# Patient Record
Sex: FEMALE
[De-identification: names, ages, dates, MRNs, and addresses within clinical notes are randomized per-mention and may not be internally consistent; named-entity substitution may affect disease eponyms.]

---

## 2022-07-20 LAB
BUN BLD-MCNC: 19 MG/DL (ref 7–18)
GLUCOSE SERPLBLD-MCNC: 89 MG/DL (ref 74–106)
HCT VFR BLD CALC: 32.8 % (ref 36–45)
LYMPHOCYTES # SPEC AUTO: 1.4 K/UL (ref 0.7–4.9)
MCV RBC: 81.4 FL (ref 80–100)
PMV BLD: 7.4 FL (ref 7.6–11.3)
POTASSIUM SERPL-SCNC: 3.9 MMOL/L (ref 3.5–5.1)
RBC # BLD: 4.03 M/UL (ref 3.86–4.86)

## 2022-07-20 NOTE — EKG
Test Date:    2022-07-20               Test Time:    11:04:55

Technician:   JEAN CARLOS                                     

                                                     

MEASUREMENT RESULTS:                                       

Intervals:                                           

Rate:         81                                     

AZ:           146                                    

QRSD:         82                                     

QT:           396                                    

QTc:          460                                    

Axis:                                                

P:            14                                     

AZ:           146                                    

QRS:          12                                     

T:            6                                      

                                                     

INTERPRETIVE STATEMENTS:                                       

                                                     

Normal sinus rhythm

Normal ECG

Compared to ECG 01/19/2015 12:46:45

Prolonged QT interval no longer present



Electronically Signed On 07-20-22 12:38:51 CDT by Ken Amador

## 2022-07-22 ENCOUNTER — HOSPITAL ENCOUNTER (OUTPATIENT)
Dept: HOSPITAL 97 - OR | Age: 62
Discharge: HOME | End: 2022-07-22
Attending: ORTHOPAEDIC SURGERY
Payer: COMMERCIAL

## 2022-07-22 VITALS — SYSTOLIC BLOOD PRESSURE: 133 MMHG | TEMPERATURE: 97 F | DIASTOLIC BLOOD PRESSURE: 60 MMHG

## 2022-07-22 VITALS — OXYGEN SATURATION: 99 %

## 2022-07-22 DIAGNOSIS — Z20.822: ICD-10-CM

## 2022-07-22 DIAGNOSIS — S52.571A: Primary | ICD-10-CM

## 2022-07-22 PROCEDURE — 85025 COMPLETE CBC W/AUTO DIFF WBC: CPT

## 2022-07-22 PROCEDURE — 36415 COLL VENOUS BLD VENIPUNCTURE: CPT

## 2022-07-22 PROCEDURE — 73100 X-RAY EXAM OF WRIST: CPT

## 2022-07-22 PROCEDURE — 93005 ELECTROCARDIOGRAM TRACING: CPT

## 2022-07-22 PROCEDURE — 82947 ASSAY GLUCOSE BLOOD QUANT: CPT

## 2022-07-22 PROCEDURE — 87811 SARS-COV-2 COVID19 W/OPTIC: CPT

## 2022-07-22 PROCEDURE — 80048 BASIC METABOLIC PNL TOTAL CA: CPT

## 2022-07-22 PROCEDURE — 25608 OPTX DST RD XART FX/EPI SEP2: CPT

## 2022-07-22 PROCEDURE — 0PSH04Z REPOSITION RIGHT RADIUS WITH INTERNAL FIXATION DEVICE, OPEN APPROACH: ICD-10-PCS

## 2022-07-22 NOTE — OP
Date of Procedure:  07/22/2022



Surgeon:  Michael Stone MD



Preoperative Diagnosis:  Right distal radius fracture with intra-articular extension.



Postoperative Diagnosis:  Right distal radius fracture with intra-articular extension.



Procedure:  Open reduction internal fixation of distal radius fracture with fixation of 2 intra-artic
ular fragments using the Acumed volar plating system.



Estimated Blood Loss:  Less than 5 cc.



Complications:  There were no complications.



Specimens:  No pathology specimen sent.



Indications:  Ms. Worthy arrived to see me in my office in a splint.  She is neurovascularly intact
.  X-rays were reviewed which revealed a displaced distal radius fracture with slight intra-articular
 extension with loss of volar angulation and some posterior tilt as well as loss of radial inclinatio
n and some shortening.  Risks, benefits, alternatives of different methods of treating this including
 closed treatment have been discussed with her and her family.  They stated they understand things as
 presented and wish to proceed.



Description Of Procedure:  The patient was taken to the operating room and placed in the supine posit
ion.  General anesthesia was obtained by staff.  Following this, well-padded tourniquet was placed on
 superior right arm.  Right upper extremity was then prepped and draped in usual sterile fashion for 
procedure.  Following this, the arm was then elevated, but not exsanguinated.  Tourniquet was raised.
  A standard incision was taken down carefully through the volar approach of Santosh with a zigzag at t
he distal radius crease.  This revealed the flexor carpi radialis tendon which was retracted radialwa
rd to protect the radial artery.  The underlying sheath was then exploited and the flexor pollicis lo
ngus muscle belly and tendon were then swept ulnarward to protect the median nerve.  This leads down 
to the pronator, which was divided at its midsubstance and gently moved off the bone using a wood parker
dle elevator.  This revealed the fracture site. It appears to be mostly plastically deformed and does
 respond to a forceful closed reduction maneuver which reestablishes the volar tilt as well as radial
 inclination.  After this, the Acumed plate was then applied in standard fashion and appears to be we
ll positioned.  The wound was irrigated and the skin was closed using interrupted nylon sutures, foll
owed by placing Aquacel dressing as well as soft roll and a volar splint.  She was taken to recovery 
room in good condition.  There were no complications.





/DAVE

DD:  07/22/2022 12:06:56Voice ID:  [id number]

DT:  07/22/2022 22:42:27Report ID:  [id number]

## 2022-07-22 NOTE — RAD REPORT
EXAM DESCRIPTION:  RAD - Wrist Right 2 View - 7/22/2022 2:09 pm

 

CLINICAL HISTORY:  ORIF DISTAL RADIUS

Pain

 

COMPARISON:  No comparisons

 

FINDINGS:  Fluoroscopy time 1.2 minutes.

## 2025-03-15 ENCOUNTER — HOSPITAL ENCOUNTER (INPATIENT)
Dept: HOSPITAL 97 - ER | Age: 65
LOS: 3 days | Discharge: HOME | DRG: 638 | End: 2025-03-18
Attending: INTERNAL MEDICINE | Admitting: INTERNAL MEDICINE
Payer: COMMERCIAL

## 2025-03-15 VITALS — OXYGEN SATURATION: 100 %

## 2025-03-15 VITALS — BODY MASS INDEX: 22.4 KG/M2

## 2025-03-15 DIAGNOSIS — E87.0: ICD-10-CM

## 2025-03-15 DIAGNOSIS — E11.40: ICD-10-CM

## 2025-03-15 DIAGNOSIS — Z90.710: ICD-10-CM

## 2025-03-15 DIAGNOSIS — E78.00: ICD-10-CM

## 2025-03-15 DIAGNOSIS — I10: ICD-10-CM

## 2025-03-15 DIAGNOSIS — E11.10: Primary | ICD-10-CM

## 2025-03-15 DIAGNOSIS — Z79.4: ICD-10-CM

## 2025-03-15 DIAGNOSIS — C77.9: ICD-10-CM

## 2025-03-15 DIAGNOSIS — C78.89: ICD-10-CM

## 2025-03-15 DIAGNOSIS — C16.9: ICD-10-CM

## 2025-03-15 DIAGNOSIS — R64: ICD-10-CM

## 2025-03-15 DIAGNOSIS — Z79.899: ICD-10-CM

## 2025-03-15 LAB
ALBUMIN SERPL BCP-MCNC: 3.2 G/DL (ref 3.4–5)
ALBUMIN/GLOB SERPL: 0.9 {RATIO} (ref 1.1–1.8)
ALP SERPL-CCNC: 76 U/L (ref 45–117)
ALT SERPL W P-5'-P-CCNC: 22 U/L (ref 13–56)
ANION GAP SERPL CALC-SCNC: 13.5 MEQ/L (ref 5–15)
ANION GAP SERPL CALC-SCNC: 22.2 MEQ/L (ref 5–15)
ANISOCYTOSIS BLD QL: (no result)
AST SERPL W P-5'-P-CCNC: 15 U/L (ref 15–37)
BILIRUB INDIRECT SERPL-MCNC: 0.3 MG/DL (ref 0.2–0.8)
BLD SMEAR INTERP: (no result)
BUN BLD-MCNC: 28 MG/DL (ref 7–18)
BUN BLD-MCNC: 33 MG/DL (ref 7–18)
GLOBULIN SER CALC-MCNC: 3.6 G/DL (ref 2.3–3.5)
GLUCOSE SERPLBLD-MCNC: 165 MG/DL (ref 74–106)
GLUCOSE SERPLBLD-MCNC: 284 MG/DL (ref 74–106)
HCT VFR BLD CALC: 36.9 % (ref 36–45)
HGB BLD-MCNC: 12 G/DL (ref 12–15)
LIPASE SERPL-CCNC: 29 U/L (ref 13–75)
LYMPHOCYTES # SPEC AUTO: 0.7 K/UL (ref 0.7–4.9)
MAGNESIUM SERPL-MCNC: 2.1 MG/DL (ref 1.6–2.4)
MAGNESIUM SERPL-MCNC: 2.2 MG/DL (ref 1.6–2.4)
MCH RBC QN AUTO: 28.3 PG (ref 27–35)
MCHC RBC AUTO-ENTMCNC: 32.6 G/DL (ref 32–36)
MCV RBC: 86.8 FL (ref 80–100)
MICROCYTES BLD QL SMEAR: (no result)
MORPHOLOGY BLD-IMP: (no result)
NRBC # BLD: 0 10*3/UL (ref 0–0)
NRBC BLD AUTO-RTO: 0.4 % (ref 0–0)
PMV BLD: 7.2 FL (ref 7.6–11.3)
POTASSIUM SERPL-SCNC: 4.2 MEQ/L (ref 3.5–5.1)
POTASSIUM SERPL-SCNC: 4.5 MEQ/L (ref 3.5–5.1)
RBC # BLD: 4.25 M/UL (ref 3.86–4.86)
SQUAMOUS URNS QL MICRO: <5 /HPF
TROPONIN I SERPL HS-MCNC: 7.5 PG/ML (ref ?–58.9)
UA COMPLETE W REFLEX CULTURE PNL UR: (no result)
UA DIPSTICK W REFLEX MICRO PNL UR: (no result)
WBC # BLD AUTO: 1.9 THOU/UL (ref 4.3–10.9)

## 2025-03-15 PROCEDURE — 84484 ASSAY OF TROPONIN QUANT: CPT

## 2025-03-15 PROCEDURE — 80048 BASIC METABOLIC PNL TOTAL CA: CPT

## 2025-03-15 PROCEDURE — 83735 ASSAY OF MAGNESIUM: CPT

## 2025-03-15 PROCEDURE — 84100 ASSAY OF PHOSPHORUS: CPT

## 2025-03-15 PROCEDURE — 81001 URINALYSIS AUTO W/SCOPE: CPT

## 2025-03-15 PROCEDURE — 97161 PT EVAL LOW COMPLEX 20 MIN: CPT

## 2025-03-15 PROCEDURE — 82947 ASSAY GLUCOSE BLOOD QUANT: CPT

## 2025-03-15 PROCEDURE — 99285 EMERGENCY DEPT VISIT HI MDM: CPT

## 2025-03-15 PROCEDURE — 83690 ASSAY OF LIPASE: CPT

## 2025-03-15 PROCEDURE — 85025 COMPLETE CBC W/AUTO DIFF WBC: CPT

## 2025-03-15 PROCEDURE — 74177 CT ABD & PELVIS W/CONTRAST: CPT

## 2025-03-15 PROCEDURE — 96374 THER/PROPH/DIAG INJ IV PUSH: CPT

## 2025-03-15 PROCEDURE — 71045 X-RAY EXAM CHEST 1 VIEW: CPT

## 2025-03-15 PROCEDURE — 80076 HEPATIC FUNCTION PANEL: CPT

## 2025-03-15 PROCEDURE — 36415 COLL VENOUS BLD VENIPUNCTURE: CPT

## 2025-03-15 PROCEDURE — 93005 ELECTROCARDIOGRAM TRACING: CPT

## 2025-03-15 RX ADMIN — DEXMEDETOMIDINE HYDROCHLORIDE SCH MLS/HR: 100 INJECTION, SOLUTION, CONCENTRATE INTRAVENOUS at 15:46

## 2025-03-15 RX ADMIN — DEXTROSE AND SODIUM CHLORIDE SCH MLS: 5; .45 INJECTION, SOLUTION INTRAVENOUS at 15:21

## 2025-03-15 RX ADMIN — DEXTROSE MONOHYDRATE SCH MLS: 50 INJECTION, SOLUTION INTRAVENOUS at 21:30

## 2025-03-15 RX ADMIN — DEXMEDETOMIDINE HYDROCHLORIDE SCH MLS/HR: 100 INJECTION, SOLUTION, CONCENTRATE INTRAVENOUS at 19:00

## 2025-03-15 RX ADMIN — ONDANSETRON PRN MG: 2 INJECTION INTRAMUSCULAR; INTRAVENOUS at 17:28

## 2025-03-15 RX ADMIN — PROMETHAZINE HYDROCHLORIDE PRN MG: 25 INJECTION INTRAMUSCULAR; INTRAVENOUS at 21:06

## 2025-03-15 RX ADMIN — SODIUM CHLORIDE SCH: 0.45 INJECTION, SOLUTION INTRAVENOUS at 15:21

## 2025-03-15 NOTE — RAD REPORT
EXAMINATION: ONE VIEW CHEST XR



CLINICAL INDICATION: weakness



TECHNIQUE: Frontal chest projection is submitted. Examination is limited by patient positioning and t
echnique.



COMPARISON: 1/19/2017



FINDINGS:



The lungs are well inflated and clear. The heart is normal in size. No displaced fractures identified
.  Right-sided port catheter is tip in SVC.



IMPRESSION: 



No acute intrathoracic abnormalities. 







Reported By: Andres Van 

Electronically Signed:  3/15/2025 11:48 AM

## 2025-03-15 NOTE — ER
Nurse's Notes                                                                                     

 [facility]                                                                 

Name: Fela Olivarez                                                                              

Age: 64 yrs                                                                                       

Sex: Female                                                                                       

: 1960                                                                                   

MRN: [id number]                                                                                   

Arrival Date: 03/15/2025                                                                          

Time: 10:47                                                                                       

Account#: [id number]                                                                            

Bed 20                                                                                            

Private MD:                                                                                       

Diagnosis: Diabetic ketoacidosis                                                                  

                                                                                                  

Presentation:                                                                                     

03/15                                                                                             

11:05 Chief complaint: Generalized weakness and cough x 1 week. On chemo for GI cancer, last  hb  

      treatment was Mon-Wed. Coronavirus screen: At this time, the client does not indicate       

      any symptoms associated with coronavirus-19. Ebola Screen: No symptoms or risks             

      identified at this time. Initial Sepsis Screen: Does the patient meet any 2 criteria?       

      No. Patient's initial sepsis screen is negative. Does the patient have a suspected          

      source of infection? No. Patient's initial sepsis screen is negative. Risk Assessment:      

      Do you want to hurt yourself or someone else? Patient reports no desire to harm self or     

      others. Onset of symptoms was 2025.                                               

11:05 Method Of Arrival: Wheelchair                                                           hb  

11:05 Acuity: LUCIA 2                                                                           hb  

                                                                                                  

Triage Assessment:                                                                                

11:05 General: Appears distressed, comfortable, Behavior is cooperative, appropriate for age, bp  

      anxious. Pain: Denies pain. EENT: No deficits noted. Neuro: No deficits noted.              

      Cardiovascular: Rhythm is sinus tachycardia. Respiratory: No deficits noted. GI:            

      Reports nausea. : No signs and/or symptoms were reported regarding the genitourinary      

      system. Derm: No deficits noted. Musculoskeletal: No deficits noted.                        

                                                                                                  

Historical:                                                                                       

- Allergies:                                                                                      

11:09 No Known Drug Allergies;                                                                hb  

- PMHx:                                                                                           

11:09 diabetes mellitus; Hypercholesterolemia; Hypertensive disorder;                         hb  

                                                                                                  

- Immunization history:: Adult Immunizations up to date.                                          

- Infectious Disease History:: Denies.                                                            

- Social history:: Smoking status: Patient denies any tobacco usage or history of.                

- Family history:: not pertinent.                                                                 

                                                                                                  

                                                                                                  

Screenin:18 [facility] ED Fall Risk Assessment (Adult) History of falling in the last 3 months,       bp  

      including since admission No falls in past 3 months (0 pts) Confusion or Disorientation     

      No (0 pts) Intoxicated or Sedated No (0 pts) Impaired Gait No (0 pts) Mobility Assist       

      Device Used No (0 pt) Altered Elimination No (0 pt) Score/Fall Risk Level 0 - 2 = Low       

      Risk Oriented to surroundings. Abuse screen: Denies threats or abuse. Denies injuries       

      from another. Nutritional screening: No deficits noted. Tuberculosis screening: No          

      symptoms or risk factors identified.                                                        

                                                                                                  

Assessment:                                                                                       

11:10 General: Appears in no apparent distress. uncomfortable. Pain: Complains of pain in     bp  

      abdomen. Neuro: No deficits noted. Cardiovascular: Rhythm is sinus tachycardia.             

      Respiratory: No deficits noted. GI: Reports nausea, vomiting. : No signs and/or           

      symptoms were reported regarding the genitourinary system. EENT: No deficits noted.         

      Derm: No deficits noted. Musculoskeletal: No deficits noted.                                

                                                                                                  

Vital Signs:                                                                                      

11:05  / 57; Pulse 132; Resp 15; Temp 99.4(O); Pulse Ox 100% on R/A; Weight 52.16 kg;   hb  

      Height 5 ft. 0 in. ; Pain 3/10;                                                             

13:04  / 64; Pulse 116; Resp 21; Pulse Ox 100% ;                                        bp  

14:41  / 49; Pulse 109; Resp 18; Pulse Ox 100% ;                                        bp  

11:05 Body Mass Index 22.46 (52.16 kg, 152.4 cm)                                              hb  

11:05 Pain Scale: Adult                                                                       hb  

                                                                                                  

ED Course:                                                                                        

10:51 Patient arrived in ED.                                                                  al6 

10:55 Joey Purcell, RN is Primary Nurse.                                                    bp  

10:56 Mazin Ballard MD is Attending Physician.                                            rt  

11:08 Triage completed.                                                                       hb  

11:10 Arm band placed on.                                                                     hb  

11:18 Patient has correct armband on for positive identification.                             bp  

11:18 Initial lab(s) drawn, by me, sent to lab. EKG done, by ED staff, reviewed by Mazin Ballard MD. Inserted saline lock: 22 gauge in right forearm, using aseptic               

      technique. Blood collected. Flushed with 10 mL NS.                                          

11:41 XRAY Chest (1 view) In Process Unspecified.                                             EDMS

13:24 Eliot Serrato MD is Hospitalizing Provider.                                            rt  

14:45 No provider procedures requiring assistance completed. Patient admitted, IV remains in  bp  

      place.                                                                                      

                                                                                                  

Administered Medications:                                                                         

11:24 Drug: NS 0.9% IV 1000 ml IV at 1000 ml once; to be given as a bolus over 60 minutes     bp  

      Route: IV; Rate: 1000 ml; Site: right forearm;                                              

11:24 Drug: Ondansetron IVP 4 mg IVP once; over 2 minutes Route: IVP; Site: right forearm;    bp  

13:39 Drug: NS 0.9% IV 1000 ml IV at 1 bolus Per protocol; to be given as a bolus over 60     bp  

      minutes Route: IV; Rate: 1 bolus; Site: left antecubital;                                   

                                                                                                  

                                                                                                  

Outcome:                                                                                          

13:24 Decision to Hospitalize by Provider.                                                    rt  

14:45 Admitted to ICU accompanied by nurse, via stretcher, room 4, with chart, Report called  bp  

      to  COLEEN LAMAR                                                                                

14:45 Condition: stable                                                                           

14:45 Instructed on the need for admit,                                                           

15:18 Patient left the ED.                                                                    bp  

                                                                                                  

Signatures:                                                                                       

Dispatcher [employer]                           EDMS                                                 

Marla Low RN                     RN   hb                                                   

Joey Purcell RN                      RN   bp                                                   

Mazin Ballard MD MD   rt                                                   

Cara Padilla6                                                  

                                                                                                  

**************************************************************************************************

## 2025-03-15 NOTE — EDPHYS
Physician Documentation                                                                           

 [facility]                                                                 

Name: Fela Olivarez                                                                              

Age: 64 yrs                                                                                       

Sex: Female                                                                                       

: 1960                                                                                   

MRN: [id number]                                                                                   

Arrival Date: 03/15/2025                                                                          

Time: 10:47                                                                                       

Account#: [id number]                                                                            

Bed 20                                                                                            

Private MD:                                                                                       

ED Physician Mazin Ballard                                                                     

HPI:                                                                                              

03/15                                                                                             

11:11 This 64 yrs old  Female presents to ER via Wheelchair with complaints of        rt  

      DEHYDRATION.                                                                                

11:11 Patient with history of gastric cancer and chemotherapy presents to the ED with nausea, rt  

      poor p.o. intake, reported generalized weakness. Denies cough, shortness of breath.         

      Denies other acute complaints at this time, symptoms are moderate in severity, no other     

      aggravating or alleviating factors..                                                        

                                                                                                  

Historical:                                                                                       

- Allergies:                                                                                      

: No Known Drug Allergies;                                                                hb  

- PMHx:                                                                                           

11:09 diabetes mellitus; Hypercholesterolemia; Hypertensive disorder;                         hb  

                                                                                                  

- Immunization history:: Adult Immunizations up to date.                                          

- Infectious Disease History:: Denies.                                                            

- Social history:: Smoking status: Patient denies any tobacco usage or history of.                

- Family history:: not pertinent.                                                                 

                                                                                                  

                                                                                                  

ROS:                                                                                              

11:11 Constitutional: Negative for fever, chills, and weight loss, Cardiovascular: Negative   rt  

      for chest pain, palpitations, and edema, Respiratory: Negative for shortness of breath,     

      cough, wheezing, and pleuritic chest pain, MS/Extremity: Negative for injury and            

      deformity, Skin: Negative for injury, rash, and discoloration,                              

11:11 Abdomen/GI: Positive for nausea, Negative for abdominal pain,                               

11:11 Neuro: Positive for dizziness, weakness,                                                    

                                                                                                  

Exam:                                                                                             

11:11 Constitutional:  This is a well developed, well nourished patient who is awake, alert,  rt  

      and in no acute distress. Head/Face:  Normocephalic, atraumatic. Chest/axilla:  Normal      

      chest wall appearance and motion.  Nontender with no deformity.  No lesions are             

      appreciated. Cardiovascular:  Regular rate and rhythm with a normal S1 and S2.  No          

      gallops, murmurs, or rubs.  Normal PMI, no JVD.  No pulse deficits. Respiratory:  Lungs     

      have equal breath sounds bilaterally, clear to auscultation and percussion.  No rales,      

      rhonchi or wheezes noted.  No increased work of breathing, no retractions or nasal          

      flaring. Abdomen/GI:  Soft, non-tender, with normal bowel sounds.  No distension or         

      tympany.  No guarding or rebound.  No evidence of tenderness throughout. Skin:  Warm,       

      dry with normal turgor.  Normal color with no rashes, no lesions, and no evidence of        

      cellulitis. MS/ Extremity:  Pulses equal, no cyanosis.  Neurovascular intact.  Full,        

      normal range of motion. Neuro:  Awake and alert, GCS 15, oriented to person, place,         

      time, and situation.  Cranial nerves II-XII grossly intact.  Motor strength 5/5 in all      

      extremities.  Sensory grossly intact.  Cerebellar exam normal.  Normal gait. Psych:         

      Awake, alert, with orientation to person, place and time.  Behavior, mood, and affect       

      are within normal limits.                                                                   

11:11 ENT: Dry mucous membranes, mucositis present.                                               

11:20 ECG was reviewed by the Attending Physician.                                            rt  

                                                                                                  

Vital Signs:                                                                                      

11:05  / 57; Pulse 132; Resp 15; Temp 99.4(O); Pulse Ox 100% on R/A; Weight 52.16 kg;   hb  

      Height 5 ft. 0 in. ; Pain 3/10;                                                             

13:04  / 64; Pulse 116; Resp 21; Pulse Ox 100% ;                                        bp  

14:41  / 49; Pulse 109; Resp 18; Pulse Ox 100% ;                                        bp  

11:05 Body Mass Index 22.46 (52.16 kg, 152.4 cm)                                              hb  

11:05 Pain Scale: Adult                                                                       hb  

                                                                                                  

MDM:                                                                                              

10:57 Medical Screening Exam initiated                                                        rt  

18:24 Differential diagnosis: Dehydration, DKA, electrolyte disturbance. Data reviewed: vital rt  

      signs, nurses notes, lab test result(s), EKG, radiologic studies. Consideration of          

      Admission/Observation Patient was admitted/placed on observation. Management of patient     

      was discussed with the following: Primary Care Provider: Agrees to admit. Test              

      considered but Not performed: CT: Dizziness due to metabolic changes, CT scan of the        

      head is not indicated. Care significantly affected by the following chronic conditions:     

      Diabetes. Counseling: I had a detailed discussion with the patient and/or guardian          

      regarding the historical points, exam findings, and any diagnostic results supporting       

      the discharge/admit diagnosis, lab results, radiology results, the need for further         

      work-up and treatment in the hospital. Response to treatment: the patient's symptoms        

      have mildly improved after treatment.                                                       

                                                                                                  

03/15                                                                                             

11:05 Order name: Basic Metabolic Panel; Complete Time: 11:53                                 rt  

03/15                                                                                             

11:05 Order name: CBC with Diff; Complete Time: 12:11                                         rt  

03/15                                                                                             

11:05 Order name: LFT's; Complete Time: 11:53                                                 rt  

03/15                                                                                             

11:05 Order name: Magnesium; Complete Time: 11:53                                             rt  

03/15                                                                                             

11:05 Order name: Troponin HS; Complete Time: 11:53                                           rt  

03/15                                                                                             

11:05 Order name: Lipase; Complete Time: 11:53                                                rt  

03/15                                                                                             

12:11 Order name: CBC Smear Scan; Complete Time: 12:11                                        EDMS

03/15                                                                                             

13:14 Order name: Glucose, Ancillary Testing                                                  EDMS

03/15                                                                                             

14:16 Order name: Glucose, Ancillary Testing                                                  EDMS

03/15                                                                                             

11:05 Order name: XRAY Chest (1 view); Complete Time: 11:52                                   rt  

03/15                                                                                             

11:05 Order name: Cardiac monitoring; Complete Time: 11:17                                    rt  

03/15                                                                                             

11:05 Order name: EKG - Nurse/Tech; Complete Time: 11:10                                      rt  

03/15                                                                                             

11:05 Order name: IV Saline Lock; Complete Time: 11:                                        rt  

03/15                                                                                             

11:05 Order name: Labs collected and sent; Complete Time: 11:                               rt  

03/15                                                                                             

11:05 Order name: O2 Per Protocol; Complete Time: 11:                                       rt  

03/15                                                                                             

11:05 Order name: O2 Sat Monitoring; Complete Time: 11:                                     rt  

                                                                                                  

EC:20 Rate is 118 beats/min. Rhythm is regular, Normal Sinus Rhythm with No ectopy. QRS Axis  rt  

      is Normal. NE interval is normal. QRS interval is normal. QT interval is normal. No Q       

      waves. T waves are Normal. No ST changes noted. Interpreted by me.                          

                                                                                                  

Administered Medications:                                                                         

11:24 Drug: NS 0.9% IV 1000 ml IV at 1000 ml once; to be given as a bolus over 60 minutes     bp  

      Route: IV; Rate: 1000 ml; Site: right forearm;                                              

11:24 Drug: Ondansetron IVP 4 mg IVP once; over 2 minutes Route: IVP; Site: right forearm;    bp  

13:39 Drug: NS 0.9% IV 1000 ml IV at 1 bolus Per protocol; to be given as a bolus over 60     bp  

      minutes Route: IV; Rate: 1 bolus; Site: left antecubital;                                   

                                                                                                  

                                                                                                  

Disposition:                                                                                      

18:24 Critical Care:.                                                                         rt  

                                                                                                  

Disposition Summary:                                                                              

03/15/25 13:24                                                                                    

Hospitalization Ordered                                                                           

 Notes:       Hospitalization Status: Inpatient Admission                                           
  rt

      Provider: Eliot Serrato                                                                 rt  

      Location: Intensive Care Unit                                                           rt  

      Condition: Fair                                                                         rt  

      Problem: new                                                                            rt  

      Symptoms: have improved                                                                 rt  

      Bed/Room Type: Standard                                                                 rt  

      Room Assignment: 4-(03/15/25 14:28)                                                     ty  

      Diagnosis                                                                                   

        - Diabetic ketoacidosis                                                               rt  

      Forms:                                                                                      

        - Medication Reconciliation Form                                                      rt  

        - SBAR form                                                                           rt  

        - Leadership Thank You Letter                                                         rt  

Critical care time excluding procedures:                                                          

18:24 Critical care time: Bedside Care: 30 minutes, Consultation: 5 minutes. Total time: 35   rt  

      minutes                                                                                     

                                                                                                  

Signatures:                                                                                       

Dispatcher [employer]                           EDMS                                                 

Marla Low RN                     RN   hb                                                   

Joey Purcell RN                      RN   bp                                                   

Mazin Ballard MD MD   rt                                                   

Michele Mcdaniels                               ty                                                   

                                                                                                  

Corrections: (The following items were deleted from the chart)                                    

11:05 11:05 BASIC METABOLIC PANEL+C.LAB.BRZ ordered. EDMS                                     EDMS

11:05 11:05 CBC+H.LAB.BRZ ordered. EDMS                                                       EDMS

11:05 11:05 HEPATIC FUNCTION+C.LAB.BRZ ordered. EDMS                                          EDMS

11:05 11:05 MAGNESIUM+C.LAB.BRZ ordered. EDMS                                                 EDMS

11:05 11:05 Troponin High Sensitivity+C.LAB.BRZ ordered. EDMS                                 EDMS

11:05 11:05 LIPASE+C.LAB.BRZ ordered. EDMS                                                    EDMS

11:05 11:05 Chest Single View+RAD.RAD.BRZ ordered. EDMS                                       EDMS

14:28 13:24 rt                                                                                ty  

                                                                                                  

**************************************************************************************************

## 2025-03-15 NOTE — P.HP
Patient History


Date of Service: 03/15/25


Reason for admission: NAUSEA, WEAK


History of Present Illness: 





KATIE HAS BEEN VERY NAUSEOUS SINCE CHEMO.  HER AG ON ADMISSION IS 26.  SHE IS A 

DIABETIC.  SHE IS IN DKA AND I ASKED ER MD TO START HER ON INSULIN DRIP.  SHE 

HAS ELECT MANAGED EVERY FEW HOURS. AG IS DOWN TO 13 NOW. 


Allergies





No Known Drug Allergies Allergy (Verified 07/20/22 10:49)


   Unknown





Home medications list reviewed: Yes


Home Medications: 








Dapagliflozin Propanediol [Farxiga] 10 mg PO DAILY 11/19/24 


Amlodipine [Norvasc] 10 mg PO DAILY 03/15/25 


Insulin Detemir [Levemir] 15 units SQ BID 03/15/25 


Insulin Lispro 8 unit SQ TID 03/15/25 


Magic Mouthwash [Magic Mouthwash*] 10 ml PO BID 03/15/25 


Multivitamin [Multiple Vitamins] 1 each PO DAILY 03/15/25 


Omeprazole [Prilosec] 40 mg PO DAILY 03/15/25 


Ondansetron [Zofran] 8 mg PO Q8H PRN 03/15/25 


Thiamine HCl [Vitamin B-1] 100 mg PO DAILY 03/15/25 








- Past Medical/Surgical History


Has patient received pneumonia vaccine in the past: Yes


Diabetic: Yes


-: HTN


-: neuorpathy


-: Hyperlipidemia


-: GI stomach cancer Nov 24 mets to esophagus, lymph nodes.


-: Non-insulin-dependent diabetes


-: Gi bleed.


-: hysterectomy


-: current chemo Monday, wears chemo pump for 46 hours


-: Wrist surgery


Psychosocial/ Personal History: Lives at home with family





- Family History


  ** Mother


-: Kidney disease





- Social History


Smoking Status: Never smoker


Alcohol use: No


CD- Drugs: No


Caffeine use: Yes


Place of Residence: Home





Review of Systems


10-point ROS is otherwise unremarkable


General: Weakness





Physical Examination





- Vital Signs


Temperature: 97.8 F


Blood Pressure: 119/50


Pulse: 114


Respirations: 18


Pulse Ox (%): 100





- Physical Exam


General: Oriented x3, Mild distress, Moderate distress


HEENT: Atraumatic, PERRLA, Mucous membr. moist/pink, EOMI, Sclerae nonicteric


Neck: Supple, 2+ carotid pulse no bruit, No LAD, Without JVD or thyroid 

abnormality


Respiratory: Clear to auscultation bilaterally, Normal air movement


Cardiovascular: Regular rate/rhythm, Normal S1 S2


Gastrointestinal: Normal bowel sounds, No tenderness


Musculoskeletal: No tenderness


Integumentary: No rashes


Neurological: Normal gait, Normal speech, Normal strength at 5/5 x4 extr, Normal

tone, Normal affect


Lymphatics: No axilla or inguinal lymphadenopathy





- Studies


Laboratory Data (last 24 hrs)











  03/15/25 03/15/25





  11:17 11:17


 


WBC  1.90 L 


 


Hgb  12.0 


 


Hct  36.9 


 


Plt Count  157 


 


Sodium   146 H


 


Potassium   4.2


 


BUN   33 H


 


Creatinine   0.70


 


Glucose   284 H


 


Magnesium   2.2


 


Total Bilirubin   0.5


 


AST   15


 


ALT   22


 


Alkaline Phosphatase   76


 


Lipase   29











Assessment and Plan





- Problems (Diagnosis)


(1) Nausea


Current Visit: Yes   Status: Acute   


Plan: 


ZOFRAN IS NOT WORKING. 


START PHENERGAN


THIS IS FROM DKA INDUCED GI DIST.








(2) Esophageal cancer


Current Visit: Yes   Status: Chronic   


Plan: 


ADVANCED


Qualifiers: 


   Malignant neoplasm of esophagus location: lower third   Qualified Code(s): 

C15.5 - Malignant neoplasm of lower third of esophagus   





(3) Diabetic ketoacidosis


Onset Date: 01/20/15   Current Visit: No   Status: Acute   


Plan: 


DKA PROTOCOL 


INSULIN DRIP 


ELECTROLYTE CHECK


FU


KPHOS IN AM. 


LAB IN AM.


STABLE WITH GUARDED PROGNOSIS. 








- Advance Directives


Does patient have a Living Will: Yes


Does patient have a Durable POA for Healthcare: Yes

## 2025-03-16 LAB
ANION GAP SERPL CALC-SCNC: 6.1 MEQ/L (ref 5–15)
BUN BLD-MCNC: 23 MG/DL (ref 7–18)
GLUCOSE SERPLBLD-MCNC: 110 MG/DL (ref 74–106)
HCT VFR BLD CALC: 28.7 % (ref 36–45)
HGB BLD-MCNC: 9.6 G/DL (ref 12–15)
LYMPHOCYTES # SPEC AUTO: 0.9 K/UL (ref 0.7–4.9)
MAGNESIUM SERPL-MCNC: 2 MG/DL (ref 1.6–2.4)
MCH RBC QN AUTO: 28.6 PG (ref 27–35)
MCHC RBC AUTO-ENTMCNC: 33.4 G/DL (ref 32–36)
MCV RBC: 85.5 FL (ref 80–100)
NRBC # BLD: 0 10*3/UL (ref 0–0)
NRBC BLD AUTO-RTO: 0.1 % (ref 0–0)
PMV BLD: 7.7 FL (ref 7.6–11.3)
POTASSIUM SERPL-SCNC: 3.1 MEQ/L (ref 3.5–5.1)
RBC # BLD: 3.35 M/UL (ref 3.86–4.86)
WBC # BLD AUTO: 2 THOU/UL (ref 4.3–10.9)

## 2025-03-16 RX ADMIN — POTASSIUM CHLORIDE SCH MLS: 200 INJECTION, SOLUTION INTRAVENOUS at 07:38

## 2025-03-16 RX ADMIN — Medication ONE MLS: at 09:41

## 2025-03-16 RX ADMIN — SODIUM CHLORIDE SCH MLS: 0.45 INJECTION, SOLUTION INTRAVENOUS at 09:33

## 2025-03-16 RX ADMIN — HUMAN INSULIN SCH: 100 INJECTION, SOLUTION SUBCUTANEOUS at 11:30

## 2025-03-16 RX ADMIN — PANTOPRAZOLE SODIUM SCH MG: 40 TABLET, DELAYED RELEASE ORAL at 10:49

## 2025-03-16 RX ADMIN — Medication SCH: at 09:00

## 2025-03-16 NOTE — P.PN
Subjective


Date of Service: 03/16/25


Chief Complaint: NAUSEA, LOWER ABDOMEN CRAMPING, DKA


Subjective: Improving





DKA HAS RESOLVED.  NAUSEA HAS IMPROVED. SHE HAS LOWER ABDOMEN CRAMPS BUT NO 

CONSTIPATION, DIARRHEA OR BLEEDING. 





Review of Systems


10-point ROS is otherwise unremarkable


General: Weakness, Malaise


Gastrointestinal: Abdominal Pain





Physical Examination





- Vital Signs


Temperature: 97.8 F


Blood Pressure: 134/58


Pulse: 88


Respirations: 18


Pulse Ox (%): 99





- Physical Exam


General: Oriented x3, Cachectic, Mild distress


HEENT: Atraumatic, PERRLA, EOMI


Neck: Supple, JVD not distended


Respiratory: Clear to auscultation bilaterally, Normal air movement


Cardiovascular: Regular rate/rhythm, Normal S1 S2


Gastrointestinal: Normal bowel sounds, No tenderness (SOFT), Other


Musculoskeletal: No tenderness


Integumentary: No rashes


Neurological: Normal speech, Normal tone, Normal affect


Lymphatics: No axilla or inguinal lymphadenopathy





- Studies


Laboratory Data (last 24 hrs)











  03/15/25 03/15/25





  11:17 11:17


 


WBC  1.90 L 


 


Hgb  12.0 


 


Hct  36.9 


 


Plt Count  157 


 


Sodium   146 H


 


Potassium   4.2


 


BUN   33 H


 


Creatinine   0.70


 


Glucose   284 H


 


Magnesium   2.2


 


Total Bilirubin   0.5


 


AST   15


 


ALT   22


 


Alkaline Phosphatase   76


 


Lipase   29








Medications List Reviewed: Yes





Assessment And Plan





- Current Problems (Diagnosis)


(1) Nausea


Current Visit: Yes   Status: Acute   


Plan: 


ZOFRAN IS NOT WORKING. 


START PHENERGAN


THIS IS FROM DKA INDUCED GI DIST.








(2) Esophageal cancer


Current Visit: Yes   Status: Chronic   


Plan: 


ADVANCED


Qualifiers: 


   Malignant neoplasm of esophagus location: lower third   Qualified Code(s): 

C15.5 - Malignant neoplasm of lower third of esophagus   





(3) Diabetic ketoacidosis


Onset Date: 01/20/15   Current Visit: No   Status: Acute   


Plan: 


DKA PROTOCOL 


INSULIN DRIP 


ELECTROLYTE CHECK


FU


KPHOS IN AM. 


LAB IN AM.


STABLE WITH GUARDED PROGNOSIS. 








(4) Abdominal cramping


Current Visit: Yes   Status: Acute   


Plan: 


CT ABDOMEN AND PELVIS WITH CONTRAST. 


HISTORY OF ADVANCED ESOPHAGEAL CANCER.


LEVSIN PRN FOR PAIN.

## 2025-03-16 NOTE — RAD REPORT
EXAMINATION: CT Abdomen   Pelvis W Contrast



CLINICAL INDICATION: Female, 64 years old.  pain, cramping, nausea, vomiting



TECHNIQUE: CT abdomen and pelvis was performed, after the administration of IV contrast, as per [facility] protocol. Axial, sagittal and coronal reconstructions were obtained. One or more of the

following dose reduction techniques were used: Automated exposure control, adjustment of the mA and k
V according to patient size, and iterative reconstruction. Unless otherwise specified, incidental

findings do not require dedicated imaging follow-up. 

 



COMPARISON: 11/19/2024





FINDINGS: 



LOWER CHEST: The visualized lung bases are clear.



LIVER: Normal in size and contour. No focal lesion.



BILIARY SYSTEM: No suspicious abnormalities.



SPLEEN: Normal size.  No focal lesion.



PANCREAS: No mass, ductal dilation, or shakira-pancreatic fluid.



ADRENALS: Normal; no mass.



KIDNEYS: Normal size and contour. No hydronephrosis.



URINARY BLADDER: Unremarkable.



GASTROINTESTINAL TRACT: Redemonstration of nodular masslike wall thickening and hyperenhancement in t
he region of the gastric cardia, extending towards the lesser curvature, today measured at 3.8 x

2.1 cm, although motion artifact limits evaluation. Nonspecific mild wall prominence along the ascend
ing through proximal transverse colon. No evidence of free air, significant intra-abdominal free

fluid, bowel obstruction or abscess. 



APPENDIX: Appendix not visualized, but no inflammatory changes in region of appendix.



LYMPH NODES: No lymphadenopathy.



MUSCULOSKELETAL: No acute or suspicious osseous abnormality.



ADDITIONAL FINDINGS: None.





IMPRESSION: 



Redemonstration of nodular masslike wall thickening along the gastric cardia, reduced in AP extent si
nce the prior exam. Please correlate with history of interval treatment if any.



Nonspecific mild wall prominence along the ascending and proximal transverse colon, could, although i
nfectious or inflammatory colitis can show a similar appearance.



Motion artifact limits evaluation.







Reported By: Kenroy Guzman 

Electronically Signed:  3/16/2025 2:40 PM

## 2025-03-17 LAB
ANION GAP SERPL CALC-SCNC: 11.4 MEQ/L (ref 5–15)
ANION GAP SERPL CALC-SCNC: 18.2 MEQ/L (ref 5–15)
ANION GAP SERPL CALC-SCNC: 18.6 MEQ/L (ref 5–15)
BUN BLD-MCNC: 14 MG/DL (ref 7–18)
BUN BLD-MCNC: 18 MG/DL (ref 7–18)
BUN BLD-MCNC: 19 MG/DL (ref 7–18)
GLUCOSE SERPLBLD-MCNC: 181 MG/DL (ref 74–106)
GLUCOSE SERPLBLD-MCNC: 187 MG/DL (ref 74–106)
GLUCOSE SERPLBLD-MCNC: 272 MG/DL (ref 74–106)
HCT VFR BLD CALC: 30.1 % (ref 36–45)
HGB BLD-MCNC: 10 G/DL (ref 12–15)
LYMPHOCYTES # SPEC AUTO: 0.9 K/UL (ref 0.7–4.9)
MAGNESIUM SERPL-MCNC: 1.7 MG/DL (ref 1.6–2.4)
MCH RBC QN AUTO: 28.5 PG (ref 27–35)
MCHC RBC AUTO-ENTMCNC: 33 G/DL (ref 32–36)
MCV RBC: 86.4 FL (ref 80–100)
NRBC # BLD: 0 10*3/UL (ref 0–0)
NRBC BLD AUTO-RTO: 0.1 % (ref 0–0)
PMV BLD: 8 FL (ref 7.6–11.3)
POTASSIUM SERPL-SCNC: 3.4 MEQ/L (ref 3.5–5.1)
POTASSIUM SERPL-SCNC: 3.6 MEQ/L (ref 3.5–5.1)
POTASSIUM SERPL-SCNC: 4.2 MEQ/L (ref 3.5–5.1)
RBC # BLD: 3.49 M/UL (ref 3.86–4.86)
WBC # BLD AUTO: 2.8 THOU/UL (ref 4.3–10.9)

## 2025-03-17 RX ADMIN — MAGNESIUM SULFATE IN DEXTROSE ONE MLS: 10 INJECTION, SOLUTION INTRAVENOUS at 07:36

## 2025-03-17 RX ADMIN — INSULIN LISPRO SCH UNIT: 100 INJECTION, SOLUTION INTRAVENOUS; SUBCUTANEOUS at 19:33

## 2025-03-17 RX ADMIN — DEXMEDETOMIDINE HYDROCHLORIDE SCH MLS/HR: 100 INJECTION, SOLUTION, CONCENTRATE INTRAVENOUS at 07:36

## 2025-03-17 RX ADMIN — DEXTROSE AND SODIUM CHLORIDE SCH MLS: 5; .45 INJECTION, SOLUTION INTRAVENOUS at 11:34

## 2025-03-17 RX ADMIN — SODIUM CHLORIDE SCH MG: 0.9 INJECTION, SOLUTION INTRAVENOUS at 07:37

## 2025-03-17 RX ADMIN — INSULIN GLARGINE SCH UNIT: 100 INJECTION, SOLUTION SUBCUTANEOUS at 18:28

## 2025-03-17 RX ADMIN — POTASSIUM CHLORIDE SCH MLS: 200 INJECTION, SOLUTION INTRAVENOUS at 19:34

## 2025-03-17 RX ADMIN — HUMAN INSULIN ONE: 100 INJECTION, SUSPENSION SUBCUTANEOUS at 06:30

## 2025-03-17 RX ADMIN — SODIUM CHLORIDE SCH MLS: 0.45 INJECTION, SOLUTION INTRAVENOUS at 09:18

## 2025-03-17 RX ADMIN — Medication SCH ML: at 19:34

## 2025-03-17 RX ADMIN — Medication ONE MLS: at 07:36

## 2025-03-17 RX ADMIN — SODIUM CHLORIDE ONE: 0.9 INJECTION, SOLUTION INTRAVENOUS at 08:09

## 2025-03-17 NOTE — P.PN
Subjective


Date of Service: 03/17/25


Chief Complaint: NAUSEA, LOWER ABDOMEN CRAMPING, DKA





DKA HAS RESOLVED.  NAUSEA HAS IMPROVED. SHE HAS LOWER ABDOMEN CRAMPS BUT NO 

CONSTIPATION, DIARRHEA OR BLEEDING. 





SHE IMPROVED YESTERDAY BUT HAD LOW NORMAL GLUCOSE ALL ALONG AND COULD NOT BE 

SUPPLEMENTED WITH INSULIN.  SHE SLIPPED BACK INTO DKA AND WE HAVE RESUMED 

INSULIN DRIP. I WILL STOP FARXIGA SO WE HAVE ROOM FOR INSULIN TO BE ADMINISTERED

AND HER GLUCOSE WILL BE HIGH ENOUGH SO SHE WILL TOLERATE IT. 





Review of Systems


10-point ROS is otherwise unremarkable


General: Weakness





Physical Examination





- Vital Signs


Temperature: 98.8 F


Blood Pressure: 128/51


Pulse: 103


Respirations: 22


Pulse Ox (%): 100





- Physical Exam


General: Oriented x3, Cachectic, Mild distress


HEENT: Atraumatic, PERRLA, EOMI


Neck: Supple, JVD not distended


Respiratory: Clear to auscultation bilaterally, Normal air movement


Cardiovascular: Regular rate/rhythm, Normal S1 S2


Gastrointestinal: Normal bowel sounds, No tenderness


Musculoskeletal: No tenderness


Integumentary: No rashes


Neurological: Normal speech, Normal tone, Normal affect


Lymphatics: No axilla or inguinal lymphadenopathy





- Studies


Medications List Reviewed: Yes





Assessment And Plan





- Current Problems (Diagnosis)


(1) Nausea


Current Visit: Yes   Status: Acute   


Plan: 


ZOFRAN IS NOT WORKING. 


START PHENERGAN


THIS IS FROM DKA INDUCED GI DIST.





CT ABDOMEN SHOWS NO BLOCKAGES.


ESOPHAGEAL CANCER VISIBLE


NO METS IN ABDOMEN.


BOWEL INFLAMMATION NOTED. 








(2) Esophageal cancer


Current Visit: Yes   Status: Chronic   


Plan: 


ADVANCED


Qualifiers: 


   Malignant neoplasm of esophagus location: lower third   Qualified Code(s): 

C15.5 - Malignant neoplasm of lower third of esophagus   





(3) Diabetic ketoacidosis


Onset Date: 01/20/15   Current Visit: No   Status: Acute   


Plan: 


DKA PROTOCOL 


INSULIN DRIP 


ELECTROLYTE CHECK


FU


KPHOS IN AM. 


LAB IN AM.


STABLE WITH GUARDED PROGNOSIS. 





SHE IMPROVED YESTERDAY BUT HAD LOW NORMAL GLUCOSE ALL ALONG AND COULD NOT BE 

SUPPLEMENTED WITH INSULIN.  SHE SLIPPED BACK INTO DKA AND WE HAVE RESUMED 

INSULIN DRIP. I WILL STOP FARXIGA SO WE HAVE ROOM FOR INSULIN TO BE ADMINISTERED

AND HER GLUCOSE WILL BE HIGH ENOUGH SO SHE WILL TOLERATE IT.

















(4) Abdominal cramping


Current Visit: Yes   Status: Acute   


Plan: 


CT ABDOMEN AND PELVIS WITH CONTRAST. 


HISTORY OF ADVANCED ESOPHAGEAL CANCER.


LEVSIN PRN FOR PAIN.

## 2025-03-18 VITALS — DIASTOLIC BLOOD PRESSURE: 71 MMHG | SYSTOLIC BLOOD PRESSURE: 147 MMHG

## 2025-03-18 VITALS — TEMPERATURE: 98.1 F

## 2025-03-18 LAB
ANION GAP SERPL CALC-SCNC: 10.3 MEQ/L (ref 5–15)
ANISOCYTOSIS BLD QL: (no result)
BLD SMEAR INTERP: (no result)
BUN BLD-MCNC: 17 MG/DL (ref 7–18)
GLUCOSE SERPLBLD-MCNC: 143 MG/DL (ref 74–106)
HCT VFR BLD CALC: 26 % (ref 36–45)
HGB BLD-MCNC: 8.8 G/DL (ref 12–15)
LYMPHOCYTES # SPEC AUTO: 1.2 K/UL (ref 0.7–4.9)
MAGNESIUM SERPL-MCNC: 1.9 MG/DL (ref 1.6–2.4)
MCH RBC QN AUTO: 28.4 PG (ref 27–35)
MCHC RBC AUTO-ENTMCNC: 33.8 G/DL (ref 32–36)
MCV RBC: 84.2 FL (ref 80–100)
MICROCYTES BLD QL SMEAR: (no result)
MORPHOLOGY BLD-IMP: (no result)
NRBC # BLD: 0 10*3/UL (ref 0–0)
NRBC BLD AUTO-RTO: 0 % (ref 0–0)
PMV BLD: 7.8 FL (ref 7.6–11.3)
POTASSIUM SERPL-SCNC: 3.3 MEQ/L (ref 3.5–5.1)
RBC # BLD: 3.09 M/UL (ref 3.86–4.86)
WBC # BLD AUTO: 3.1 THOU/UL (ref 4.3–10.9)

## 2025-03-18 RX ADMIN — ENOXAPARIN SODIUM SCH MG: 40 INJECTION SUBCUTANEOUS at 08:00

## 2025-03-18 RX ADMIN — Medication ONE MLS: at 08:00

## 2025-03-18 NOTE — P.DS
Admission Date: 03/15/25


Discharge Date: 03/18/25


Disposition: ROUTINE DISCHARGE


Discharge Condition: FAIR


Reason for Admission: NAUSEA, LOWER ABDOMEN CRAMPING, DKA





- Problems


(1) Nausea


Current Visit: Yes   Status: Acute   





(2) Esophageal cancer


Current Visit: Yes   Status: Chronic   


Qualifiers: 


   Malignant neoplasm of esophagus location: lower third   Qualified Code(s): 

C15.5 - Malignant neoplasm of lower third of esophagus   





(3) Diabetic ketoacidosis


Onset Date: 01/20/15   Current Visit: No   Status: Acute   





(4) Abdominal cramping


Current Visit: Yes   Status: Acute   


Brief History of Present Illness: 





KATIE HAS BEEN VERY NAUSEOUS SINCE CHEMO.  HER AG ON ADMISSION IS 26.  SHE IS A 

DIABETIC.  SHE IS IN DKA AND I ASKED ER MD TO START HER ON INSULIN DRIP.  SHE 

HAS ELECT MANAGED EVERY FEW HOURS. AG IS DOWN TO 13 NOW. 


Hospital Course: 





KATIE CAME WITH DKA, AFTER RECOVERY WENT BACK INTO DKA AS HER GLUCOSE WAS TOO 

LOW TO TOLERATE INSULIN.  I STOPPED FARXIGA AS IT WILL GIVE ROOM INSULIN TO BE 

ADMINISTERED.  SHE DID WELL. SHE NOW IS OFF DRIP AGAIN. DOING WELL WITH FOOD. 

TOLERATED MED AND IS STABLE TO GO HOME. 


Vital Signs/Physical Exam: 














Temp Pulse Resp BP Pulse Ox


 


 98.1 F   95 H  18   147/71 H  100 


 


 03/18/25 07:00  03/18/25 09:00  03/18/25 09:00  03/18/25 09:00  03/18/25 09:00








Laboratory Data at Discharge: 














WBC  3.10 thou/uL (4.3-10.9)  L  03/18/25  05:21    


 


Hgb  8.8 g/dL (12.0-15.0)  L D 03/18/25  05:21    


 


Hct  26.0 % (36.0-45.0)  L  03/18/25  05:21    


 


Plt Count  113 thou/uL (152-406)  L  03/18/25  05:21    


 


Sodium  141 mEq/L (136-145)   03/18/25  05:21    


 


Potassium  3.3 mEq/L (3.5-5.1)  L  03/18/25  05:21    


 


BUN  17 mg/dL (7-18)   03/18/25  05:21    


 


Creatinine  0.55 mg/dL (0.55-1.02)   03/18/25  05:21    


 


Glucose  143 mg/dL ()  H  03/18/25  05:21    


 


Phosphorus  1.7 mg/dL (2.5-4.9)  L  03/18/25  05:21    


 


Magnesium  1.9 mg/dL (1.6-2.4)   03/18/25  05:21    


 


Total Bilirubin  0.5 mg/dL (0.2-1.0)   03/15/25  11:17    


 


AST  15 U/L (15-37)   03/15/25  11:17    


 


ALT  22 U/L (13-56)   03/15/25  11:17    


 


Alkaline Phosphatase  76 U/L ()   03/15/25  11:17    


 


Lipase  29 U/L (13-75)   03/15/25  11:17    








Home Medications: 








Amlodipine [Norvasc*] 10 mg PO DAILY 03/15/25 


Insulin Detemir [Levemir] 15 units SQ BID 03/15/25 


Insulin Lispro 8 unit SQ TID 03/15/25 


Magic Mouthwash [Magic Mouthwash*] 10 ml PO BID 03/15/25 


Multivitamin [Multiple Vitamins] 1 each PO DAILY 03/15/25 


Omeprazole [Prilosec] 40 mg PO DAILY 03/15/25 


Ondansetron [Zofran] 8 mg PO Q8H PRN 03/15/25 


Thiamine HCl [Vitamin B-1] 100 mg PO DAILY 03/15/25 





Followup: 


Eliot Serrato MD [Primary Care Provider] -

## 2025-03-19 NOTE — EKG
Test Date:    2025-03-15               Test Time:    11:10:54

Technician:   FRANK                                     

                                                     

MEASUREMENT RESULTS:                                       

Intervals:                                           

Rate:         118                                    

KS:           126                                    

QRSD:         76                                     

QT:           316                                    

QTc:          442                                    

Axis:                                                

P:            52                                     

KS:           126                                    

QRS:          48                                     

T:            61                                     

                                                     

INTERPRETIVE STATEMENTS:                                       

                                                     

Sinus tachycardia

Otherwise normal ECG

Compared to ECG 04/11/2023 12:04:37

Sinus rhythm no longer present

ST (T wave) deviation no longer present



Electronically Signed On 03-19-25 12:31:56 CDT by Maikel Ramos